# Patient Record
Sex: MALE | Race: WHITE | URBAN - NONMETROPOLITAN AREA
[De-identification: names, ages, dates, MRNs, and addresses within clinical notes are randomized per-mention and may not be internally consistent; named-entity substitution may affect disease eponyms.]

---

## 2017-12-13 ENCOUNTER — TELEPHONE (OUTPATIENT)
Dept: FAMILY MEDICINE CLINIC | Age: 37
End: 2017-12-13

## 2017-12-13 NOTE — TELEPHONE ENCOUNTER
Patient called in, hasn't been seen since March 2014. Has been doing pretty well up until April this year, abdominal pain that he had is the past has been back but was manageable but has become more predominant in last month. Stomach is bloated, lots of gas, frequent bowel movements, wakes up at night and has to urgently go to the bathroom. Would like to come in to be evaluated again. No fever, no vomiting    Okay to re-accept?

## 2018-01-26 ENCOUNTER — TELEPHONE (OUTPATIENT)
Dept: FAMILY MEDICINE CLINIC | Age: 38
End: 2018-01-26

## 2023-03-02 ENCOUNTER — TELEPHONE (OUTPATIENT)
Dept: FAMILY MEDICINE CLINIC | Age: 43
End: 2023-03-02

## 2023-03-02 NOTE — TELEPHONE ENCOUNTER
Past patient of Dr Anna Owen - last seen since 2014 - overall doing well - had not needed a PCP     Noticed some blood with his stool last few weeks - no abdominal pain, no vomiting, no diarrhea -     Is wanting to get re-established and referred to GI for colonoscopy